# Patient Record
Sex: MALE
[De-identification: names, ages, dates, MRNs, and addresses within clinical notes are randomized per-mention and may not be internally consistent; named-entity substitution may affect disease eponyms.]

---

## 2020-09-18 PROBLEM — Z00.00 ENCOUNTER FOR PREVENTIVE HEALTH EXAMINATION: Status: ACTIVE | Noted: 2020-09-18

## 2020-09-24 ENCOUNTER — APPOINTMENT (OUTPATIENT)
Dept: OTOLARYNGOLOGY | Facility: CLINIC | Age: 50
End: 2020-09-24
Payer: COMMERCIAL

## 2020-09-24 VITALS
SYSTOLIC BLOOD PRESSURE: 117 MMHG | BODY MASS INDEX: 23.54 KG/M2 | HEART RATE: 68 BPM | DIASTOLIC BLOOD PRESSURE: 69 MMHG | TEMPERATURE: 98.1 F | WEIGHT: 150 LBS | HEIGHT: 67 IN

## 2020-09-24 DIAGNOSIS — J34.2 DEVIATED NASAL SEPTUM: ICD-10-CM

## 2020-09-24 DIAGNOSIS — R06.83 SNORING: ICD-10-CM

## 2020-09-24 DIAGNOSIS — R09.81 NASAL CONGESTION: ICD-10-CM

## 2020-09-24 DIAGNOSIS — J34.3 HYPERTROPHY OF NASAL TURBINATES: ICD-10-CM

## 2020-09-24 DIAGNOSIS — G47.33 OBSTRUCTIVE SLEEP APNEA (ADULT) (PEDIATRIC): ICD-10-CM

## 2020-09-24 PROCEDURE — 99204 OFFICE O/P NEW MOD 45 MIN: CPT | Mod: 25

## 2020-09-24 PROCEDURE — 31231 NASAL ENDOSCOPY DX: CPT

## 2020-09-24 RX ORDER — FLUTICASONE PROPIONATE 50 UG/1
50 SPRAY, METERED NASAL DAILY
Qty: 1 | Refills: 3 | Status: ACTIVE | COMMUNITY
Start: 2020-09-24 | End: 1900-01-01

## 2020-09-24 NOTE — REASON FOR VISIT
[Initial Evaluation] : an initial evaluation for [Thyroid Problem] : thyroid problem [FreeTextEntry2] : sleep apnea

## 2020-09-24 NOTE — PROCEDURE

## 2020-09-24 NOTE — ASSESSMENT
[FreeTextEntry1] : wake ups - likely LPR\par will proceed to start lifestyle regiment to reduce overproduction of acid and reduce laryngeal reflux including avoiding caffein, alcohol, eating before bed, spicy and fatty foods, and head elevation at night etc. Handout detailing regiment also given\par \par possible LOWELL - full BOT, did recently gain wt\par wt loss\par dental appliance \par sleep study\par Nasal congestion with mild septal deviation and bilateral turbinate hypertrophy. Will start regiment to see if may improve symptoms and escalate if needed \par - Will start Flonase. A topical steroid reduce mucosal swelling, illustrated appropriate use and how to reduce the risk of bleeding \par - Nasal irrigation and showed how to use it to maximize effectiveness \par wanted to hold off on allergy testing for now, discussed dust precautions\par

## 2020-09-24 NOTE — HISTORY OF PRESENT ILLNESS
[de-identified] : feels wakes up choking \par snoring has been getting much worse or gets heavy breathing \par also has difficulty with insomnia \par + daytime fatigue , and mid day naps \par no AM TITUS \par drinks coffee\par \par mouth breathing with nasal congestion all year around b/l \par no running or sinus pressure\par feels may have dust allergy\par no allergy meds\par

## 2021-09-26 ENCOUNTER — TRANSCRIPTION ENCOUNTER (OUTPATIENT)
Age: 51
End: 2021-09-26